# Patient Record
Sex: FEMALE | URBAN - METROPOLITAN AREA
[De-identification: names, ages, dates, MRNs, and addresses within clinical notes are randomized per-mention and may not be internally consistent; named-entity substitution may affect disease eponyms.]

---

## 2022-09-16 ENCOUNTER — APPOINTMENT (RX ONLY)
Dept: URBAN - METROPOLITAN AREA CLINIC 76 | Facility: CLINIC | Age: 72
Setting detail: DERMATOLOGY
End: 2022-09-16

## 2022-09-16 DIAGNOSIS — L65.9 NONSCARRING HAIR LOSS, UNSPECIFIED: ICD-10-CM

## 2022-09-16 DIAGNOSIS — L21.8 OTHER SEBORRHEIC DERMATITIS: ICD-10-CM

## 2022-09-16 PROCEDURE — ? PRESCRIPTION

## 2022-09-16 PROCEDURE — ? COUNSELING

## 2022-09-16 PROCEDURE — 99204 OFFICE O/P NEW MOD 45 MIN: CPT

## 2022-09-16 PROCEDURE — ? TREATMENT REGIMEN

## 2022-09-16 RX ORDER — KETOCONAZOLE 20 MG/ML
SHAMPOO, SUSPENSION TOPICAL
Qty: 120 | Refills: 0 | Status: ERX | COMMUNITY
Start: 2022-09-16

## 2022-09-16 RX ORDER — CLOBETASOL PROPIONATE 0.5 MG/ML
SOLUTION TOPICAL
Qty: 50 | Refills: 2 | Status: ERX | COMMUNITY
Start: 2022-09-16

## 2022-09-16 RX ADMIN — CLOBETASOL PROPIONATE: 0.5 SOLUTION TOPICAL at 00:00

## 2022-09-16 RX ADMIN — KETOCONAZOLE: 20 SHAMPOO, SUSPENSION TOPICAL at 00:00

## 2022-09-16 NOTE — HPI: HAIR LOSS
Previous Labs: Yes
How Did The Hair Loss Occur?: gradual in onset
How Severe Is Your Hair Loss?: mild
When Were The Labs Drawn? (Drawn...): April 2022
Lab Details: High cholesterol and blood pressure
What Hair Products Do You Use?: Dandruff shampoo, biotin shampoo, etc

## 2022-09-16 NOTE — PROCEDURE: TREATMENT REGIMEN
Initiate Treatment: 1. Clobetasol solution. BID to affected areas of scalp, can wash out after one hour\\n2. Ketoconazole shampoo 2-3 times/weekly
Plan: Offered punch biopsy, patient would like to defer at this time
Detail Level: Zone

## 2022-10-19 ENCOUNTER — APPOINTMENT (RX ONLY)
Dept: URBAN - METROPOLITAN AREA CLINIC 76 | Facility: CLINIC | Age: 72
Setting detail: DERMATOLOGY
End: 2022-10-19

## 2022-10-19 DIAGNOSIS — L21.8 OTHER SEBORRHEIC DERMATITIS: ICD-10-CM

## 2022-10-19 DIAGNOSIS — L65.9 NONSCARRING HAIR LOSS, UNSPECIFIED: ICD-10-CM

## 2022-10-19 PROCEDURE — ? ADDITIONAL NOTES

## 2022-10-19 PROCEDURE — ? COUNSELING

## 2022-10-19 PROCEDURE — ? ORDER TESTS

## 2022-10-19 PROCEDURE — 99213 OFFICE O/P EST LOW 20 MIN: CPT

## 2022-10-19 PROCEDURE — ? TREATMENT REGIMEN

## 2022-10-19 NOTE — PROCEDURE: ADDITIONAL NOTES
Render Risk Assessment In Note?: no
Detail Level: Simple
Additional Notes: Patient brought copy of lab report today, noted bilirubin high. Patient has had multiple blood work done and reports everything has been within normal limits.\\n\\Scarlet serrano

## 2022-10-19 NOTE — PROCEDURE: TREATMENT REGIMEN
Otc Regimen: Discussed starting Rogaine (minoxidil) 2% daily
Continue Regimen: 1. Clobetasol solution. BID to affected areas of scalp, can wash out after one hour\\n2. Ketoconazole shampoo 2-3 times/weekly
Plan: Offered punch biopsy again today, patient still would like to defer at this time; addressed patient’s concerns today. Labs ordered today to complete out what patient hasn’t completed already, will check more labs to see if other levels are stable. Discussed switching to a different topical that is more oily to help, patient deferred as she is okay with the one she is currently using.
Detail Level: Zone

## 2022-10-19 NOTE — PROCEDURE: ORDER TESTS
Billing Type: Third-Party Bill
Bill For Surgical Tray: no
Expected Date Of Service: 10/19/2022
Performing Laboratory: 0

## 2022-10-27 ENCOUNTER — APPOINTMENT (RX ONLY)
Dept: URBAN - METROPOLITAN AREA CLINIC 76 | Facility: CLINIC | Age: 72
Setting detail: DERMATOLOGY
End: 2022-10-27

## 2022-10-27 DIAGNOSIS — L21.8 OTHER SEBORRHEIC DERMATITIS: ICD-10-CM

## 2022-10-27 DIAGNOSIS — L65.9 NONSCARRING HAIR LOSS, UNSPECIFIED: ICD-10-CM

## 2022-10-27 PROCEDURE — ? REFERRAL

## 2022-10-27 PROCEDURE — ? TREATMENT REGIMEN

## 2022-10-27 PROCEDURE — ? COUNSELING

## 2022-10-27 PROCEDURE — ? ADDITIONAL NOTES

## 2022-10-27 PROCEDURE — 99213 OFFICE O/P EST LOW 20 MIN: CPT

## 2022-10-27 NOTE — PROCEDURE: TREATMENT REGIMEN
Otc Regimen: Discussed starting Rogaine (minoxidil) 2% daily
Continue Regimen: 1. Clobetasol solution. BID to affected areas of scalp, can wash out after one hour\\n2. Ketoconazole shampoo 2-3 times/weekly
Plan: Went over labwork with patient noted MARILOU was the only concern. Patient reports history of Ankylosing spondylitis. 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 Will refer to rheumatologist. Patient will send email to send records for rheumatologist of her choice once she gets the name.\\n\\n\\nBiopsy offered again, patient continues to decline.  Treatment options for possible LPP discussed
Detail Level: Zone